# Patient Record
Sex: FEMALE | URBAN - METROPOLITAN AREA
[De-identification: names, ages, dates, MRNs, and addresses within clinical notes are randomized per-mention and may not be internally consistent; named-entity substitution may affect disease eponyms.]

---

## 2022-07-22 ENCOUNTER — OFFICE VISIT (OUTPATIENT)
Dept: ORTHOPEDIC SURGERY | Age: 22
End: 2022-07-22
Payer: COMMERCIAL

## 2022-07-22 DIAGNOSIS — M20.12 HALLUX VALGUS OF LEFT FOOT: ICD-10-CM

## 2022-07-22 DIAGNOSIS — M79.672 LEFT FOOT PAIN: Primary | ICD-10-CM

## 2022-07-22 PROCEDURE — 99203 OFFICE O/P NEW LOW 30 MIN: CPT | Performed by: ORTHOPAEDIC SURGERY

## 2022-07-22 NOTE — PROGRESS NOTES
instability. 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 We did discuss recovery process for this as well as the risk of nonunion malunion infection incomplete pain relief recurrence of the bunion and need for hardware removal.  We have given him details about the surgery today. He will call us back if they have questions or would like to proceed.

## 2022-09-19 ENCOUNTER — INITIAL CONSULT (OUTPATIENT)
Dept: CARDIOLOGY CLINIC | Age: 22
End: 2022-09-19
Payer: COMMERCIAL

## 2022-09-19 VITALS
HEIGHT: 64 IN | BODY MASS INDEX: 21.68 KG/M2 | WEIGHT: 127 LBS | SYSTOLIC BLOOD PRESSURE: 124 MMHG | DIASTOLIC BLOOD PRESSURE: 62 MMHG | HEART RATE: 76 BPM

## 2022-09-19 DIAGNOSIS — Q24.9 CONGENITAL HEART DISEASE: ICD-10-CM

## 2022-09-19 DIAGNOSIS — Z76.89 ENCOUNTER TO ESTABLISH CARE: Primary | ICD-10-CM

## 2022-09-19 PROCEDURE — 99203 OFFICE O/P NEW LOW 30 MIN: CPT | Performed by: INTERNAL MEDICINE

## 2022-09-19 PROCEDURE — 93000 ELECTROCARDIOGRAM COMPLETE: CPT | Performed by: INTERNAL MEDICINE

## 2022-09-19 ASSESSMENT — ENCOUNTER SYMPTOMS: SHORTNESS OF BREATH: 0

## 2022-09-19 NOTE — PROGRESS NOTES
[facility] CARDIOLOGY  [address], [address]  David Goode, [address] [place] [address]  PHONE: [phone]      24    NAME:  Odette Philip  : 2000  MRN: [id number]         SUBJECTIVE:   Odette Philip is a 23 y.o. female seen for a consultation visit regarding the following:     Chief Complaint   Patient presents with    Consultation            HPI:  Consultation is requested by Haylie Carpenter PA-C for evaluation of Consultation   . Patient states she has had echo every 4 years because of a congenital heart murmur, somewhere in [place], [place], she was told there was some kind of hole in the heart. Doesn't know her exact diagnosis. Goes to the gym or plays tennis about once a week. Here with her boyfriend. Past Medical History, Past Surgical History, Family history, Social History, and Medications were all reviewed with the patient today and updated as necessary. Prior to Admission medications    Not on File     Allergies   Allergen Reactions    Pcn [Penicillins] Other (See Comments)     Child allergy     Past Medical History:   Diagnosis Date    Asthma      Past Surgical History:   Procedure Laterality Date    MYRINGOTOMY AND TYMPANOSTOMY TUBE PLACEMENT      OTHER SURGICAL HISTORY      phrenulectomy     History reviewed. No pertinent family history. Social History     Tobacco Use    Smoking status: Never    Smokeless tobacco: Never   Substance Use Topics    Alcohol use: Not on file     Comment: minimal       ROS:    Review of Systems   Cardiovascular:  Negative for chest pain. Respiratory:  Negative for shortness of breath. PHYSICAL EXAM:   /62   Pulse 76   Ht 5' 4\" (1.626 m)   Wt 127 lb (57.6 kg)   BMI 21.80 kg/m²      Physical Exam  Constitutional:       Appearance: Normal appearance. HENT:      Head: Normocephalic and atraumatic. Eyes:      Conjunctiva/sclera: Conjunctivae normal.   Neck:      Vascular: No carotid bruit.    Cardiovascular:      Rate and Rhythm: Normal rate and regular rhythm. Heart sounds: No murmur heard. No friction rub. No gallop. Pulmonary:      Effort: No respiratory distress. Breath sounds: No wheezing or rales. Abdominal:      General: Abdomen is flat. There is no distension. Palpations: Abdomen is soft. Tenderness: There is no abdominal tenderness. Musculoskeletal:         General: No swelling. Cervical back: Neck supple. Skin:     General: Skin is warm and dry. Neurological:      General: No focal deficit present. Mental Status: She is alert. Psychiatric:         Mood and Affect: Mood normal.         Behavior: Behavior normal.       Medical problems and test results were reviewed with the patient today. DATA REVIEW    Aug 2022:      Normal CBC, BMP, TSH, vit D, vit B12    EKG    Sinus  Rhythm 76  with sinus arrhythmia  normal axis, intervals, ST and T waves  normal tracing      CXR/IMAGING        DEVICE INTERROGATION        OUTSIDE RECORDS REVIEW    Records from outside providers have been reviewed and summarized as noted in the HPI, past history and data review sections of this note, and reviewed with patient. .       ASSESSMENT and PLAN    Calista Cody was seen today for consultation. 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 Request records and get echo. Will communicate results via My Chart, if significantly abnormal return to formulate plan going forward, this was discussed with the patient who was offered in office follow up and prefers to follow up in this fashion. Return if symptoms worsen or fail to improve, for RESULTS VIA MY CHART.             Thank you for allowing me to participate in this patient's care. Please call or contact me if there are any questions or concerns regarding the above.       Alana Olmedo MD  09/19/22  11:49 AM

## 2022-10-07 ENCOUNTER — TELEPHONE (OUTPATIENT)
Dept: CARDIOLOGY CLINIC | Age: 22
End: 2022-10-07

## 2022-10-07 NOTE — TELEPHONE ENCOUNTER
----- Message from Lian Ignacio MD sent at 10/7/2022  8:48 AM EDT -----  Please notify patient her echocardiogram is normal with a small atrial septal defect which is what was mentioned in prior pediatric notes which were obtained. Recommend return in ~ 2 -3 years.